# Patient Record
(demographics unavailable — no encounter records)

---

## 2024-11-20 NOTE — ASSESSMENT
[FreeTextEntry1] : 74 F for prescreening for colonoscopy.  Last colonoscopy over 10 years ago. No polyps as per patient.  Due for colonoscopy. Bowel preparation instructions discussed, and written instructions given with date and time. 12/18/24 at 2:15pm.  Will do regular bowel prep.

## 2024-11-20 NOTE — HISTORY OF PRESENT ILLNESS
[FreeTextEntry1] : PCP: Dr. Servando Kuhn  73 y/o F with PMHx of HTN, chronic sinusitis here for prescreening for colonoscopy. HTN controlled. Denies any chest pain, MI, Pulmonary or GI issues. Active and able to walk miles with no SOB.  Bowel movements are daily x1-2. Stools are soft. Denies need to strain. Denies blood in stool. Denies pain with BM. Denies any hemorrhoids. Denies change in bowel habits, appetite or unintentional weight loss.   Last colonoscopy was over 10 years ago. We don't have report. As per patient she never had polyps.  No immediate family history of colon cancer. Aunt had breast cancer and grandmother with skin cancer.  Never had problem with anesthesia. Had seen PCP and everything ok.

## 2024-11-20 NOTE — REVIEW OF SYSTEMS
[Negative] : Heme/Lymph [Arthralgias] : arthralgias [Fever] : no fever [Chills] : no chills [Feeling Poorly] : not feeling poorly [Feeling Tired] : not feeling tired [Recent Weight Gain (___ Lbs)] : no recent weight gain [Recent Weight Loss (___ Lbs)] : no recent weight loss [Nosebleeds] : no nosebleeds [Nasal Discharge] : no nasal discharge [Sore Throat] : no sore throat [Hoarseness] : no hoarseness [Chest Pain] : no chest pain [Palpitations] : no palpitations [Shortness Of Breath] : no shortness of breath [Wheezing] : no wheezing [Cough] : no cough [SOB on Exertion] : no shortness of breath during exertion [Abdominal Pain] : no abdominal pain [Vomiting] : no vomiting [Constipation] : no constipation [Diarrhea] : no diarrhea [Dysuria] : no dysuria [Incontinence] : no incontinence [Pelvic Pain] : no pelvic pain [Dysmenorrhea] : no dysmenorrhea [Skin Lesions] : no skin lesions [Itching] : no itching [Confused] : no confusion [Convulsions] : no convulsions [Dizziness] : no dizziness [Fainting] : no fainting [Suicidal] : not suicidal [Anxiety] : no anxiety [Depression] : no depression [Easy Bleeding] : no tendency for easy bleeding [Easy Bruising] : no tendency for easy bruising

## 2024-11-20 NOTE — PHYSICAL EXAM
[Exam Deferred] : exam was deferred [Normal Heart Sounds] : normal heart sounds [Normal Rate and Rhythm] : normal rate and rhythm [2+] : left 2+ [No Rash or Lesion] : No rash or lesion [Alert] : alert [Oriented to Person] : oriented to person [Oriented to Place] : oriented to place [Oriented to Time] : oriented to time [Calm] : calm [Abdomen Masses] : No abdominal masses [Abdomen Tenderness] : ~T No ~M abdominal tenderness [Stool Sample Taken] : no stool obtained on rectal exam [JVD] : no jugular venous distention  [Purpura] : no purpura  [Petechiae] : no petechiae [Skin Ulcer] : no ulcer [Skin Induration] : no induration [de-identified] : soft [de-identified] : NAD [de-identified] : supple [de-identified] : Full ROM in all extremities